# Patient Record
Sex: MALE | Race: WHITE | NOT HISPANIC OR LATINO | Employment: UNEMPLOYED | URBAN - NONMETROPOLITAN AREA
[De-identification: names, ages, dates, MRNs, and addresses within clinical notes are randomized per-mention and may not be internally consistent; named-entity substitution may affect disease eponyms.]

---
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
## 2025-07-15 DIAGNOSIS — H69.93 DYSFUNCTION OF BOTH EUSTACHIAN TUBES: ICD-10-CM

## 2025-07-15 DIAGNOSIS — H72.92 PERFORATION OF LEFT TYMPANIC MEMBRANE: Primary | ICD-10-CM

## 2025-07-15 RX ORDER — CIPROFLOXACIN AND DEXAMETHASONE 3; 1 MG/ML; MG/ML
3 SUSPENSION/ DROPS AURICULAR (OTIC) 2 TIMES DAILY
Qty: 7.5 ML | Refills: 0 | Status: SHIPPED | OUTPATIENT
Start: 2025-07-15

## 2025-07-30 ENCOUNTER — OFFICE VISIT (OUTPATIENT)
Dept: OTOLARYNGOLOGY | Facility: CLINIC | Age: 20
End: 2025-07-30
Payer: COMMERCIAL

## 2025-07-30 VITALS
TEMPERATURE: 97.5 F | DIASTOLIC BLOOD PRESSURE: 62 MMHG | HEIGHT: 72 IN | SYSTOLIC BLOOD PRESSURE: 117 MMHG | WEIGHT: 131.2 LBS | HEART RATE: 81 BPM | BODY MASS INDEX: 17.77 KG/M2

## 2025-07-30 DIAGNOSIS — T85.898A VENTILATION TUBE BLOCKED, INITIAL ENCOUNTER: ICD-10-CM

## 2025-07-30 DIAGNOSIS — H66.006 RECURRENT ACUTE SUPPURATIVE OTITIS MEDIA WITHOUT SPONTANEOUS RUPTURE OF TYMPANIC MEMBRANE OF BOTH SIDES: ICD-10-CM

## 2025-07-30 DIAGNOSIS — J34.2 NASAL SEPTAL DEVIATION: ICD-10-CM

## 2025-07-30 DIAGNOSIS — H69.93 EUSTACHIAN TUBE DYSFUNCTION, BILATERAL: Primary | ICD-10-CM

## 2025-07-30 NOTE — PROGRESS NOTES
Fox Salter MD  [facility] EAR NOSE & THROAT  [address], [address]  [facility] [place] [facility] [address]  Fax [phone]  Phone [phone]      Visit Type: FOLLOW UP   Chief Complaint   Patient presents with    Ear Problem     Right ear Tube laying in canal. Per mom, it is covered in cerumen.           History of Present Illness  The patient presents for evaluation of tympanostomy tubes and a suspected deviated nasal septum. He is accompanied by his parents.    The patient has a history of tympanostomy tube placement, with one tube currently in situ and a perforation on the contralateral side. He reports no otalgia but expresses aversion to the prescribed otic drops. His mother has been providing photographic documentation of his ear to the healthcare provider.    Additionally, his mother reports that his nasal bridge appears asymmetrical when he removes his glasses, raising concerns about a potential deviated septum or previous nasal trauma. The patient reports experiencing nasal obstruction and difficulty breathing through his nose.         History     Last Reviewed by Fox Salter MD on 7/30/2025 at  9:15 AM    Sections Reviewed    Tobacco, Family, Medical, Surgical    Problem list reviewed by Fox Salter MD on 7/30/2025 at  9:15 AM  Medicines reviewed by Fox Salter MD on 7/30/2025 at  9:15 AM  Allergies reviewed by Fox Salter MD on 7/30/2025 at  9:15 AM          Vital Signs:   Temp:  [97.5 °F (36.4 °C)] 97.5 °F (36.4 °C)  Heart Rate:  [81] 81  BP: (117)/(62) 117/62  ENT Physical Exam   Physical Exam  Appearance: patient appears well-developed and well-nourished  Communication/Voice: communication appropriate for developmental age; vocal quality normal  Face: head appears normal, face appears normal and face appears atraumatic  Eyes: normal periorbita  Hearing: intact  Auricles: right auricle normal; left auricle  normal  External Mastoids: right external mastoid normal; left external mastoid normal  Ear Canals: Ear tube is encased in wax on the right, this is up against the eardrum.  Tympanic membrane: The left tympanic membrane has no active drainage.  There is a small anterior perforation.  On the right, there is blockage of visualization to the extruded tube in the wax.  External Nose: nares patent bilaterally; external nose normal  Nasal Septum: Nasal septum deviates to the right side  Lips: normal  Neck: normal  Respiratory: breathing unlabored; normal breathing rate  Cardiovascular: extremities are warm and well perfused; no peripheral edema present  Mental Status: alert and oriented  Psychiatric: mood normal; affect is appropriate  Skin: no skin lesions or rashes           Result Review       RESULTS REVIEW    Results               Assessment & Plan  Eustachian tube dysfunction, bilateral         Recurrent acute suppurative otitis media without spontaneous rupture of tympanic membrane of both sides         Ventilation tube blocked, initial encounter         Nasal septal deviation              1. Ear tubes  The presence of ear tubes was noted, with one tube still in place and encased in wax.  Monitor the situation without using eardrops, as there are no current symptoms. If symptoms develop, further intervention may be considered, potentially including removal in the operating room to avoid complications.  The potential risks and benefits of removing the tube were discussed, including the possibility of causing a larger hole in the eardrum and the sensitivity of the eardrum.    2. Deviated septum  A significant deviation of the nasal septum to the right was observed, with the left side appearing relatively open.  Outpatient surgery to correct the deviated septum could be considered if symptoms worsen or if there is interest in improving nasal breathing.  The typical recovery process, the possibility of not using  splints to improve recovery time, and the risks associated with surgery were discussed.      Follow-up: A follow-up appointment is scheduled for 4 months from now, during which a hearing test will be conducted.             Patient or patient representative verbalized consent for the use of Ambient Listening during the visit with  Fox Salter MD for chart documentation. 8/1/2025  15:00 CDT  Fox Salter MD

## 2025-08-01 PROBLEM — J34.2 NASAL SEPTAL DEVIATION: Status: ACTIVE | Noted: 2025-08-01

## (undated) DEVICE — TUBING, SUCTION, 1/4" X 12', STRAIGHT: Brand: MEDLINE

## (undated) DEVICE — STERILE COTTON BALLS LARGE 5/P: Brand: MEDLINE

## (undated) DEVICE — GLV SURG BIOGEL M LTX PF 7 1/2

## (undated) DEVICE — TB EAR ARMSTR MOD 1.14MM: Type: IMPLANTABLE DEVICE | Site: EAR | Status: NON-FUNCTIONAL

## (undated) DEVICE — SURGICAL SUCTION CONNECTING TUBE WITH MALE CONNECTOR AND SUCTION CLAMP, 2 FT. LONG (.6 M), 5 MM I.D.: Brand: CONMED

## (undated) DEVICE — TOWEL,OR,DSP,ST,BLUE,DLX,10/PK,8PK/CS: Brand: MEDLINE

## (undated) DEVICE — INTEGRA® MICRO ENT BLADE,DOWNCUTTING BLADE, ANGLED SHAFT: Brand: INTEGRA®

## (undated) DEVICE — BLD MYRNGTMY BEAVR LANCE/DWN/CUT NRW 45D

## (undated) DEVICE — TOWEL,OR,DSP,ST,BLUE,STD,4/PK,20PK/CS: Brand: MEDLINE